# Patient Record
Sex: MALE | Race: WHITE | URBAN - METROPOLITAN AREA
[De-identification: names, ages, dates, MRNs, and addresses within clinical notes are randomized per-mention and may not be internally consistent; named-entity substitution may affect disease eponyms.]

---

## 2019-03-11 ENCOUNTER — OFFICE VISIT (OUTPATIENT)
Dept: URBAN - METROPOLITAN AREA CLINIC 16 | Facility: CLINIC | Age: 79
End: 2019-03-11
Payer: MEDICARE

## 2019-03-11 PROCEDURE — 92004 COMPRE OPH EXAM NEW PT 1/>: CPT | Performed by: OPTOMETRIST

## 2019-03-11 ASSESSMENT — INTRAOCULAR PRESSURE
OD: 15
OS: 16

## 2019-03-11 NOTE — IMPRESSION/PLAN
Impression: Combined forms of age-related cataract, bilateral: H25.813. OU. Plan: Cataract causing blur, discussed options, no treatment necessary at this time. Advised use of sunglasses outdoors. Monitor. Pt advised that vision can be improved with glasses, deferred refraction today. RTC prn x refraction.

## 2019-03-11 NOTE — IMPRESSION/PLAN
Impression: Type 2 diabetes mellitus w/o complication: P63.4. OU. Plan: No Non-Proliferative Diabetic Retinopathy, no Diabetic Macular Edema and no Neovascularization of the iris, disc, or elsewhere. Discussed ocular and systemic benefits of blood sugar control. Send notes to PCP. Check annually. RTC 1 year x CEE.

## 2020-04-28 ENCOUNTER — OFFICE VISIT (OUTPATIENT)
Dept: URBAN - METROPOLITAN AREA CLINIC 16 | Facility: CLINIC | Age: 80
End: 2020-04-28
Payer: MEDICARE

## 2020-04-28 DIAGNOSIS — H02.839 DERMATOCHALASIS OF UNSPECIFIED EYE, UNSPECIFIED EYELID: ICD-10-CM

## 2020-04-28 DIAGNOSIS — H25.813 COMBINED FORMS OF AGE-RELATED CATARACT, BILATERAL: ICD-10-CM

## 2020-04-28 PROCEDURE — 92014 COMPRE OPH EXAM EST PT 1/>: CPT | Performed by: OPTOMETRIST

## 2020-04-28 ASSESSMENT — KERATOMETRY
OS: 42.50
OD: 42.88

## 2020-04-28 ASSESSMENT — INTRAOCULAR PRESSURE
OD: 16
OS: 16

## 2020-04-28 NOTE — IMPRESSION/PLAN
Impression: Dermatochalasis of unspecified eye, unspecified eyelid: H02.839. OU. Plan: Pt ed re: condition. Pt feels vision is not affected at this time. Monitor condition for changes, evaluate yearly.

## 2020-04-28 NOTE — IMPRESSION/PLAN
Impression: Type 2 diabetes mellitus w/o complication: L45.5 OU. Plan: No Non-Proliferative Diabetic Retinopathy, no Diabetic Macular Edema and no Neovascularization of the iris, disc, or elsewhere. Discussed ocular and systemic benefits of blood sugar control. Send notes to PCP. Check annually. RTC 1 year x CEE.

## 2021-10-27 ENCOUNTER — OFFICE VISIT (OUTPATIENT)
Dept: URBAN - METROPOLITAN AREA CLINIC 26 | Facility: CLINIC | Age: 81
End: 2021-10-27
Payer: COMMERCIAL

## 2021-10-27 DIAGNOSIS — E11.9 TYPE 2 DIABETES MELLITUS W/O COMPLICATION: Primary | ICD-10-CM

## 2021-10-27 DIAGNOSIS — H04.123 TEAR FILM INSUFFICIENCY OF BILATERAL LACRIMAL GLANDS: ICD-10-CM

## 2021-10-27 DIAGNOSIS — Z79.4 LONG TERM (CURRENT) USE OF INSULIN: ICD-10-CM

## 2021-10-27 PROCEDURE — 99204 OFFICE O/P NEW MOD 45 MIN: CPT | Performed by: OPTOMETRIST

## 2021-10-27 PROCEDURE — 92134 CPTRZ OPH DX IMG PST SGM RTA: CPT | Performed by: OPTOMETRIST

## 2021-10-27 ASSESSMENT — VISUAL ACUITY
OD: 20/30
OS: 20/30

## 2021-10-27 ASSESSMENT — KERATOMETRY
OD: 42.50
OS: 42.25

## 2021-10-27 ASSESSMENT — INTRAOCULAR PRESSURE
OS: 17
OD: 18

## 2021-10-27 NOTE — IMPRESSION/PLAN
Impression: Combined forms of age-related cataract, bilateral: H25.813. Plan:  Discussed cataracts with patient. Discussed treatment options. Surgical treatment is recommended. Surgical risks and benefits discussed. Patient elects surgical treatment. Recommend surgery OU, OS first. multifocal lens, standard lens, LenSx, ORA, Aim OD: -0.25. Aim OS: -0.25. Outcome of surgery limitations include:  Dermatochalasis of eyelid, Tear film insufficiency of bilateral lacrimal glands.

## 2021-10-27 NOTE — IMPRESSION/PLAN
Impression: Type 2 diabetes mellitus w/o complication: E85.2. Plan: No Non-Proliferative Diabetic Retinopathy, no Diabetic Macular Edema and no Neovascularization of the iris, disc, or elsewhere. Discussed ocular and systemic benefits of blood sugar control. Check annually.

## 2021-10-27 NOTE — IMPRESSION/PLAN
Impression: Dermatochalasis of eyelid: H02.839. Plan: Significant. Consider lid evaluation after cataract surgery.

## 2021-11-29 ENCOUNTER — ADULT PHYSICAL (OUTPATIENT)
Dept: URBAN - METROPOLITAN AREA CLINIC 24 | Facility: CLINIC | Age: 81
End: 2021-11-29
Payer: COMMERCIAL

## 2021-11-29 DIAGNOSIS — Z01.818 ENCOUNTER FOR OTHER PREPROCEDURAL EXAMINATION: Primary | ICD-10-CM

## 2021-11-29 PROCEDURE — 99203 OFFICE O/P NEW LOW 30 MIN: CPT | Performed by: PHYSICIAN ASSISTANT

## 2021-11-29 RX ORDER — GLIMEPIRIDE 4 MG/1
4 MG TABLET ORAL
Qty: 0 | Refills: 0 | Status: ACTIVE
Start: 2021-11-29

## 2021-11-29 RX ORDER — INSULIN DETEMIR 100 [IU]/ML
INJECTION, SOLUTION SUBCUTANEOUS
Qty: 0 | Refills: 0 | Status: ACTIVE
Start: 2021-11-29

## 2021-11-29 ASSESSMENT — PACHYMETRY
OS: 3.49
OD: 3.48
OD: 24.39
OS: 24.41

## 2021-12-07 ENCOUNTER — OFFICE VISIT (OUTPATIENT)
Dept: URBAN - METROPOLITAN AREA CLINIC 26 | Facility: CLINIC | Age: 81
End: 2021-12-07
Payer: COMMERCIAL

## 2021-12-07 PROCEDURE — 99204 OFFICE O/P NEW MOD 45 MIN: CPT | Performed by: OPHTHALMOLOGY

## 2021-12-07 ASSESSMENT — INTRAOCULAR PRESSURE
OD: 18
OS: 18

## 2021-12-07 NOTE — IMPRESSION/PLAN
Impression: Combined forms of age-related cataract, bilateral: H25.813. Plan: Discussed cataract diagnosis with the patient. Discussed risks, benefits and alternatives to surgery including but not limited to: bleeding, infection, risk of vision loss, loss of the eye, need for other surgery. Patient voiced understanding and wishes to proceed. Patient elects surgical treatment. Advanced technology options Discussed with patient . Patient desires surgery OU, OS first (( AIM -0.25 OU, DEXYCU, Topical anesthesia, No Lensx, No ORA, No LRI, declined AMP)) Patient understands the need for glasses after surgery for BCVA.

## 2021-12-07 NOTE — IMPRESSION/PLAN
Impression: Type 2 diabetes mellitus w/o complication: E47.6. Plan: No Non-Proliferative Diabetic Retinopathy, no Diabetic Macular Edema and no Neovascularization of the iris, disc, or elsewhere. Discussed ocular and systemic benefits of blood sugar control. Discussed with patient, understands this may limit vision after surgery.

## 2021-12-07 NOTE — IMPRESSION/PLAN
Impression: Tear film insufficiency of bilateral lacrimal glands: H04.123. Plan: Recommend artificial tears at least 4 times a day and gel drop or tear ointment at bedtime, Omega 3 fatty acids (2-3,000 mg) daily. Avoid overhead fans at bedtime. Discussed with patient, understands this may limit vision after surgery.

## 2021-12-14 ENCOUNTER — SURGERY (OUTPATIENT)
Dept: URBAN - METROPOLITAN AREA SURGERY 12 | Facility: SURGERY | Age: 81
End: 2021-12-14
Payer: COMMERCIAL

## 2021-12-14 PROCEDURE — 66984 XCAPSL CTRC RMVL W/O ECP: CPT | Performed by: OPHTHALMOLOGY

## 2021-12-15 ENCOUNTER — POST-OPERATIVE VISIT (OUTPATIENT)
Dept: URBAN - METROPOLITAN AREA CLINIC 26 | Facility: CLINIC | Age: 81
End: 2021-12-15
Payer: COMMERCIAL

## 2021-12-15 DIAGNOSIS — Z48.810 ENCOUNTER FOR SURGICAL AFTERCARE FOLLOWING SURGERY ON A SENSE ORGAN: Primary | ICD-10-CM

## 2021-12-15 PROCEDURE — 99024 POSTOP FOLLOW-UP VISIT: CPT | Performed by: OPTOMETRIST

## 2021-12-15 ASSESSMENT — INTRAOCULAR PRESSURE: OS: 20

## 2021-12-20 ENCOUNTER — POST-OPERATIVE VISIT (OUTPATIENT)
Dept: URBAN - METROPOLITAN AREA CLINIC 26 | Facility: CLINIC | Age: 81
End: 2021-12-20
Payer: COMMERCIAL

## 2021-12-20 PROCEDURE — 99024 POSTOP FOLLOW-UP VISIT: CPT | Performed by: OPTOMETRIST

## 2021-12-20 ASSESSMENT — INTRAOCULAR PRESSURE
OS: 20
OD: 18

## 2021-12-20 ASSESSMENT — VISUAL ACUITY: OS: 20/30

## 2021-12-20 ASSESSMENT — KERATOMETRY
OS: 42.00
OD: 42.38

## 2021-12-20 NOTE — IMPRESSION/PLAN
Impression: S/P Cataract Extraction by phacoemulsification with IOL placement OS - 6 Days. Encounter for surgical aftercare following surgery on a sense organ  Z48.810. Excellent post op course   Condition is improving - Plan: mild k edema still present. pt ok to proceed with cataract surgery OD.

## 2021-12-28 ENCOUNTER — SURGERY (OUTPATIENT)
Dept: URBAN - METROPOLITAN AREA SURGERY 12 | Facility: SURGERY | Age: 81
End: 2021-12-28
Payer: COMMERCIAL

## 2021-12-28 PROCEDURE — 66984 XCAPSL CTRC RMVL W/O ECP: CPT | Performed by: OPHTHALMOLOGY

## 2021-12-29 ENCOUNTER — POST-OPERATIVE VISIT (OUTPATIENT)
Dept: URBAN - METROPOLITAN AREA CLINIC 30 | Facility: CLINIC | Age: 81
End: 2021-12-29
Payer: COMMERCIAL

## 2021-12-29 PROCEDURE — 99024 POSTOP FOLLOW-UP VISIT: CPT | Performed by: OPTOMETRIST

## 2021-12-29 ASSESSMENT — INTRAOCULAR PRESSURE: OD: 17

## 2021-12-29 NOTE — IMPRESSION/PLAN
Impression: S/P Cataract Extraction by phacoemulsification with IOL placement OD - 1 Day. Presence of intraocular lens  Z96.1.  Post operative instructions reviewed - Plan: RTC as sched

## 2022-02-01 ENCOUNTER — POST-OPERATIVE VISIT (OUTPATIENT)
Dept: URBAN - METROPOLITAN AREA CLINIC 26 | Facility: CLINIC | Age: 82
End: 2022-02-01
Payer: COMMERCIAL

## 2022-02-01 DIAGNOSIS — Z96.1 PRESENCE OF INTRAOCULAR LENS: Primary | ICD-10-CM

## 2022-02-01 PROCEDURE — 99024 POSTOP FOLLOW-UP VISIT: CPT | Performed by: OPTOMETRIST

## 2022-02-01 ASSESSMENT — INTRAOCULAR PRESSURE
OD: 16
OS: 17

## 2022-02-01 ASSESSMENT — VISUAL ACUITY
OD: 20/30
OS: 20/30

## 2022-02-01 NOTE — IMPRESSION/PLAN
Impression: S/P Cataract Extraction by phacoemulsification with IOL placement OD - 35 Days. Presence of intraocular lens  Z96.1. Excellent post op course   Condition is improving - Plan: Recommend at's 4-5 x a day. srx available. pt will continue care with his eye doctor elsewhere.

## 2024-10-10 ENCOUNTER — OFFICE VISIT (OUTPATIENT)
Dept: URBAN - METROPOLITAN AREA CLINIC 26 | Facility: CLINIC | Age: 84
End: 2024-10-10
Payer: MEDICARE

## 2024-10-10 DIAGNOSIS — Z96.1 PRESENCE OF INTRAOCULAR LENS: ICD-10-CM

## 2024-10-10 DIAGNOSIS — H26.492 OTHER SECONDARY CATARACT, LEFT EYE: ICD-10-CM

## 2024-10-10 DIAGNOSIS — H04.123 TEAR FILM INSUFFICIENCY OF BILATERAL LACRIMAL GLANDS: ICD-10-CM

## 2024-10-10 DIAGNOSIS — Z79.4 LONG TERM (CURRENT) USE OF INSULIN: ICD-10-CM

## 2024-10-10 DIAGNOSIS — H52.4 PRESBYOPIA: ICD-10-CM

## 2024-10-10 DIAGNOSIS — E11.9 TYPE 2 DIABETES MELLITUS WITHOUT COMPLICATIONS: Primary | ICD-10-CM

## 2024-10-10 PROCEDURE — 92014 COMPRE OPH EXAM EST PT 1/>: CPT | Performed by: OPTOMETRIST

## 2024-10-10 PROCEDURE — 92134 CPTRZ OPH DX IMG PST SGM RTA: CPT | Performed by: OPTOMETRIST

## 2024-10-10 ASSESSMENT — INTRAOCULAR PRESSURE
OS: 19
OD: 17

## 2024-10-10 ASSESSMENT — KERATOMETRY
OD: 42.75
OS: 43.25

## 2024-10-10 ASSESSMENT — VISUAL ACUITY
OS: 20/25
OD: 20/40